# Patient Record
(demographics unavailable — no encounter records)

---

## 2025-03-19 NOTE — DISCUSSION/SUMMARY
[Cardiomyopathy] : cardiomyopathy [Echocardiogram] : echocardiogram [Coronary Artery Disease] : coronary artery disease [AICD Function Normal] : normal AICD function [Hyperlipidemia] : hyperlipidemia [Stable] : stable [Lipids Test Panel] : a fasting lipid profile [Patient] : the patient [Minutes Spent: ___] : for [unfilled] ~Uminutes [___ Month(s)] : in [unfilled] month(s) [FreeTextEntry1] : Overall cardiovascular status appears stable. No evidence of orthostasis on repeated testing. Optimizing guideline directed medical therapy, avoiding beta-blocker and ARB due to history of dizziness and hypotension.  On SGLT2 inhibitor. ICD check as per schedule. Labs reviewed , low LDL = 35, briseida cut back on Crestor to 20 mg daily. f/u lipids with endocrionology in 3-4 months. TTE requested to reassess LVEF.

## 2025-03-19 NOTE — REASON FOR VISIT
[Structural Heart and Valve Disease] : structural heart and valve disease [Follow-Up - Clinic] : a clinic follow-up of [Coronary Artery Disease] : coronary artery disease [Dizziness] : dizziness [Hyperlipidemia] : hyperlipidemia [Hypertension] : hypertension

## 2025-03-19 NOTE — CARDIOLOGY SUMMARY
[de-identified] : 3/19/25, Sinus Rhythm -First degree A-V block, -Nonspecific QRS widening. -Nonspecific T-abnormality. 8/5/24, Sinus Rhythm, VPC's -Nonspecific T-abnormality. 11/1/23, Sinus Rhythm, -Nonspecific T-abnormality. 1/16/23, Sinus  Rhythm  - occasional ectopic ventricular beat   -Nonspecific QRS widening.  -  Nonspecific T-abnormality.  6/30/22, SR, Freq ectopic beats, NSSTW changes [de-identified] : 12/9/2022, mildly diminished left ventricular systolic function, EF equals 46%, wall motion abnormalities, grade 1 diastolic dysfunction, mild AI, MR, trace TR, mildly dilated ascending aorta.   6/18/21, EF 55%, mild to mod AI, trace MR,TR. [de-identified] : 3/8/2023, Lexiscan SPECT study, small moderately severe fixed defect in apical wall and a large severe fixed defect in the involving the basal inferior, basal inferolateral, mid inferolateral and basal anterolateral walls suggestive of scar involving the right coronary and/or left circumflex arteries.  No evidence of ischemia.  Moderate global hypokinesis, akinesis of the anterolateral and inferolateral segments with EF equals 38%.  Perfusion pattern is similar to that seen on previous study dated 9/26/2016.

## 2025-03-19 NOTE — PHYSICAL EXAM
[Well Developed] : well developed [Well Nourished] : well nourished [No Acute Distress] : no acute distress [Normal Conjunctiva] : normal conjunctiva [Normal Venous Pressure] : normal venous pressure [No Carotid Bruit] : no carotid bruit [Normal S1, S2] : normal S1, S2 [No Rub] : no rub [No Gallop] : no gallop [Clear Lung Fields] : clear lung fields [Good Air Entry] : good air entry [No Respiratory Distress] : no respiratory distress  [Soft] : abdomen soft [Non Tender] : non-tender [No Masses/organomegaly] : no masses/organomegaly [Normal Bowel Sounds] : normal bowel sounds [Normal Gait] : normal gait [No Cyanosis] : no cyanosis [No Clubbing] : no clubbing [No Varicosities] : no varicosities [No Rash] : no rash [No Skin Lesions] : no skin lesions [Moves all extremities] : moves all extremities [No Focal Deficits] : no focal deficits [Normal Speech] : normal speech [Alert and Oriented] : alert and oriented [Normal memory] : normal memory [de-identified] : 2/4 diastolic murmur at LLSB. [de-identified] : Left foot in Unna boot.

## 2025-03-19 NOTE — HISTORY OF PRESENT ILLNESS
[FreeTextEntry1] : 82-year-old male well-known to my service with history of atherosclerotic heart disease, status post myocardial infarction in 1995 and bypass surgery in 1998.  Cardiac catheterization in 2010 showed depressed left Ventricular function, patent LIMA and occluded vein grafts.  Patient is status post ICD placement for cardiomyopathy.  Also known history of hypertension, hyperlipidemia, history of gout, history of depression. [place] [employer] functional class II. H/O persistent gait imbalance thought to be due to orthostasis/autonomic dysfunction, several falls. Improve with gait training. Mild weight gain noted. Followed by Dr. Sullivan for endocrinology.  Hospitalized in 2024 for cellulitis/suspected osteomyelitis. Now resolved. No other new symptoms.